# Patient Record
(demographics unavailable — no encounter records)

---

## 2024-11-01 NOTE — ASSESSMENT
[FreeTextEntry1] : Impression: TELMA VALDEZ is a 65 year old man who presents for follow up office visit for further evaluation of joint symptoms and rheumatic diseases, Including Raynaud's, osteoarthritis, and history of vitamin D deficiency.  Note: Patient Last seen February 2024  Patient feels fairly well. Denies recent joint pain, with his osteoarthritis quiescent. No recent Raynaud's Episodes.  The diltiazem had been very helpful for his Raynaud's last winter but as the weather became warmer he felt there was no need for it so he discontinued the medication.  Denies recent dry eyes and dry mouth, however on PE pt has dry eyes and slightly moist tongue consider Sjogren's Syndrome and evaluate for sarcoidosis, hepatitis C, IgG 4 related disease and other related diseases. Pt not using artificial tears nor Biotene mouthwash secondary to feeling no need for them.  He continues his vitamin D supplement for his history of vitamin D deficiency.  Patient denies rash or side effects with current medications. Patient is content with current medication regimen.  Plan: I reviewed patients chart and previous records with extensive discussion I reviewed his previous lab results with extensive discussion Laboratory tests ordered today - see list below - with coordination of care  For this next venipuncture,, withhold vitamin D supplements the day of the encounter and  the 2 days prior--to recheck vitamin D level  X-rays ordered  see list below  with coordination of care -- to be performed in January 2025 Diagnosis and prognosis discussed Continue current medications (other than those changed below) Patient declined change or addition of any medications at this time If his Raynaud's becomes more frequent and bothersome, he should call me and we will consider restarting the diltiazem depending on his course--extensive discussion Artificial tears one drop each eye q.i.d. and p.r.n.(Possible side effects explained) Biotene mouthwash/spray q.i.d. and p.r.n.(Possible side effects explained)  Oral Hydration Patient declines oral medication for dryness  Keep hands and feet and torso warm - patient and wife warned of dangers of gangrene/digital amputation with Raynaud's -- with extensive discussion Increase home thermostat to at least 72 to 74 F -- with extensive discussion  Return visit 4 months All questions and concerns were addressed  Total time for this office visit, including face-to-face time and non-face-to-face time, 86 minutes--- including review of the chart and previous records, detailed review of his medical history, review of previous lab results with extensive discussion with the patient, ordering lab tests with coordination of care, review of previous imaging reports/x-ray results, ordering of new x-rays with coordination of care, detailed medication history, review of medications going forward with their possible side effects, reviewed protocol for prevention of Raynaud's with extensive discussion as noted above, reviewed the modalities for treatment of his dryness with extensive discussion, reviewed the impact of the patient's rheumatic disease on their other medical problems, reviewed the impact of the patient's other medical problems on their rheumatic disease

## 2024-11-01 NOTE — HISTORY OF PRESENT ILLNESS
[FreeTextEntry1] : TELMA VALDEZ is a 65 year old man who presents for follow up office visit for further evaluation of joint symptoms and rheumatic diseases including Raynaud's, osteoarthritis, and history of vitamin D deficiency.  Note: Patient Last seen 2024  Patient feels fairly well. Denies recent joint pain. No recent Raynaud's Episodes. Denies recent dry eyes and dry mouth,  not using artificial tears nor Biotene mouthwash secondary to feeling no need for them. Pt is not currently taking Diltiazem secondary to no need--- it had been very helpful for his Raynaud's during last winter but then with the warmer weather he did not need it so he stopped it on his own.  He continues his vitamin D supplement.  Patient denies rash or side effects with current medications. Patient is content with current medication regimen.    Patients Sister had  last week--she had dementia  SH  Last Week - Pt flew to [facility] due to sister expiring

## 2024-11-01 NOTE — ADDENDUM
[FreeTextEntry1] :  I, Joseph Antonio, acted solely as a scribe for Dr. Myron I. Kleiner, MD. on 10/31/2024. I personally performed the services described in the documentation, reviewed the documentation recorded by the scribe in my presence, and it accurately and completely records my words and actions.

## 2024-11-01 NOTE — CONSULT LETTER
[Dear  ___] : Dear  [unfilled], [Consult Letter:] : I had the pleasure of evaluating your patient, [unfilled]. [Please see my note below.] : Please see my note below. [Consult Closing:] : Thank you very much for allowing me to participate in the care of this patient.  If you have any questions, please do not hesitate to contact me. [Sincerely,] : Sincerely, [DrRolf  ___] : Dr. MEYERS [FreeTextEntry3] : Myron Myron I. Kleiner, M.D., FACR  Chief, Division of Rheumatology Department of Medicine  [facility]  [DrRolf ___] : Dr. MEYERS

## 2025-03-07 NOTE — CONSULT LETTER
[Dear  ___] : Dear  [unfilled], [Consult Letter:] : I had the pleasure of evaluating your patient, [unfilled]. [Please see my note below.] : Please see my note below. [Consult Closing:] : Thank you very much for allowing me to participate in the care of this patient.  If you have any questions, please do not hesitate to contact me. [Sincerely,] : Sincerely, [DrRolf  ___] : Dr. MEYERS [DrRolf ___] : Dr. MEYERS [FreeTextEntry3] : Myron Myron I. Kleiner, M.D., FACR  Chief, Division of Rheumatology Department of Medicine  [facility]

## 2025-03-07 NOTE — ADDENDUM
[FreeTextEntry1] :  I, Joseph Antonio, acted solely as a scribe for Dr. Myron I. Kleiner, MD. on 03/07/2025. I personally performed the services described in the documentation, reviewed the documentation recorded by the scribe in my presence, and it accurately and completely records my words and actions.

## 2025-03-07 NOTE — HISTORY OF PRESENT ILLNESS
[FreeTextEntry1] : TELMA VALDEZ is a 65 year old man who presents for follow up office visit for further evaluation of joint symptoms and rheumatic diseases including Raynaud's, osteoarthritis, and history of vitamin D deficiency.  Note: Patient Last seen October 2024  Patient feels fairly well.  Denies recent joint pain.  No recent Raynaud's Episodes. Pt denies recent dry eyes and dry mouth. Denies recent easy ecchymosis, gross overt bleeding and epistaxis. He continues his vitamin D supplement.  Patient denies rash or side effects with current medications. Patient is content with current medication regimen.

## 2025-03-07 NOTE — CONSULT LETTER
[Dear  ___] : Dear  [unfilled], [Consult Letter:] : I had the pleasure of evaluating your patient, [unfilled]. [Please see my note below.] : Please see my note below. [Consult Closing:] : Thank you very much for allowing me to participate in the care of this patient.  If you have any questions, please do not hesitate to contact me. [Sincerely,] : Sincerely, [DrRolf  ___] : Dr. MEYERS [DrRolf ___] : Dr. MEYERS [FreeTextEntry3] : Myron Myron I. Kleiner, M.D., FACR  Chief, Division of Rheumatology Department of Medicine  [facility]

## 2025-03-07 NOTE — ASSESSMENT
[FreeTextEntry1] : Impression: TELMA VALDEZ is a 65 year old man who presents for follow up office visit for further evaluation of joint symptoms and rheumatic diseases, Including Raynaud's, osteoarthritis, and history of vitamin D deficiency.  Note: Patient Last seen October 2024  Patient feels fairly well.  Denies recent joint pain, osteoarthritis quiescent.  No recent Raynaud's Episodes. On PE pt had dry eyes and moist tongue -- consider Sjogren's Syndrome and evaluate for sarcoidosis, hepatitis C, IgG 4 related disease and other related diseases.  Pt denies recent dry eyes and dry mouth. Denies recent easy ecchymosis, gross overt bleeding and epistaxis. Recent lab results reveal Platelets [id number], Vitamin D 25.2 (30-80), otherwise unrevealing -- with extensive discussion. Recent X-ray results reveal no significant changes or results, with extensive discussion. He continues his vitamin D supplement for his history of Vitamin D deficiency.  Patient denies rash or side effects with current medications. Patient is content with current medication regimen.  Plan: I reviewed patients chart and previous records with extensive discussion  Lab tests results reviewed with the patient with extensive discussion  X-rays results reviewed with the patient with extensive discussion Laboratory tests ordered today - see list below - with coordination of care Diagnosis and prognosis discussed Continue current medications (other than those changed below) Patient declined change or addition of any medications at this time If his Raynaud's becomes more frequent and bothersome, he should call me and we will consider restarting the diltiazem depending on his course--extensive discussion Complete Vitamin D 50,000 units twice a week--Monday and Thursday--for only 12 weeks, then OTC vitamin D 4000 units q.d. (possible side effects explained) Artificial tears one drop each eye q.i.d. and p.r.n.(Possible side effects explained) Biotene mouthwash/spray q.i.d. and p.r.n.(Possible side effects explained)  Oral Hydration Patient declines oral medication for dryness  Keep hands and feet and torso warm - patient and wife warned of dangers of gangrene/digital amputation with Raynaud's -- with extensive discussion Increase home thermostat to at least 72 to 74 F -- with extensive discussion   PCP follow up regarding decreased Platelet count seen on lab work reviewed during office visit today -- with coordination of care Return visit 4 months All questions and concerns were addressed

## 2025-03-07 NOTE — ASSESSMENT
[FreeTextEntry1] : Impression: TELMA VALDEZ is a 65 year old man who presents for follow up office visit for further evaluation of joint symptoms and rheumatic diseases, Including Raynaud's, osteoarthritis, and history of vitamin D deficiency.  Note: Patient Last seen October 2024  Patient feels fairly well.  Denies recent joint pain, osteoarthritis quiescent.  No recent Raynaud's Episodes. On PE pt had dry eyes and moist tongue -- consider Sjogren's Syndrome and evaluate for sarcoidosis, hepatitis C, IgG 4 related disease and other related diseases.  Pt denies recent dry eyes and dry mouth. Denies recent easy ecchymosis, gross overt bleeding and epistaxis. Recent lab results reveal Platelets [id number], Vitamin D 25.2 (30-80), otherwise unrevealing -- with extensive discussion. Recent X-ray results reveal no significant changes or results, with extensive discussion. He continues his vitamin D supplement for his history of Vitamin D deficiency.  Patient denies rash or side effects with current medications. Patient is content with current medication regimen.  Plan: I reviewed patients chart and previous records with extensive discussion  Lab tests results reviewed with the patient with extensive discussion  X-rays results reviewed with the patient with extensive discussion Laboratory tests ordered today - see list below - with coordination of care Diagnosis and prognosis discussed Continue current medications (other than those changed below) Patient declined change or addition of any medications at this time If his Raynaud's becomes more frequent and bothersome, he should call me and we will consider restarting the diltiazem depending on his course--extensive discussion Complete Vitamin D 50,000 units twice a week--Monday and Thursday--for only 12 weeks, then OTC vitamin D 4000 units q.d. (possible side effects explained) Artificial tears one drop each eye q.i.d. and p.r.n.(Possible side effects explained) Biotene mouthwash/spray q.i.d. and p.r.n.(Possible side effects explained)  Oral Hydration Patient declines oral medication for dryness  Keep hands and feet and torso warm - patient and wife warned of dangers of gangrene/digital amputation with Raynaud's -- with extensive discussion Increase home thermostat to at least 72 to 74 F -- with extensive discussion   PCP follow up regarding decreased Platelet count seen on lab work reviewed during office visit today -- with coordination of care Return visit 4 months All questions and concerns were addressed